# Patient Record
(demographics unavailable — no encounter records)

---

## 2024-11-07 NOTE — HISTORY OF PRESENT ILLNESS
[FreeTextEntry1] : annual exam [de-identified] : annual exam  patient states that she has been having more palpitations after burning hand  would like a refill of metoprolol that she takes for palpitations   recent mammo normal  had a colonoscopy recently soha james would like celiac profile done

## 2024-11-07 NOTE — HEALTH RISK ASSESSMENT
[Good] : ~his/her~  mood as  good [No] : In the past 12 months have you used drugs other than those required for medical reasons? No [No falls in past year] : Patient reported no falls in the past year [0] : 1) Little interest or pleasure doing things: Not at all (0) [1] : 2) Feeling down, depressed, or hopeless for several days (1) [Never] : Never [Patient reported mammogram was normal] : Patient reported mammogram was normal [With Significant Other] : lives with significant other [Smoke Detector] : smoke detector [Carbon Monoxide Detector] : carbon monoxide detector [Safety elements used in home] : safety elements used in home [Seat Belt] :  uses seat belt [Sunscreen] : uses sunscreen [PHQ-2 Negative - No further assessment needed] : PHQ-2 Negative - No further assessment needed [Patient reported colonoscopy was normal] : Patient reported colonoscopy was normal [FreeTextEntry1] : palpitations [Audit-CScore] : 0 [de-identified] : aqua therapy [de-identified] : healthy [de-identified] : pt had unsteady gait [[id number]] : 1 [Reports changes in hearing] : Reports no changes in hearing [Reports changes in vision] : Reports no changes in vision [Reports changes in dental health] : Reports no changes in dental health [MammogramDate] : 10/2024 [PapSmearComments] : awhile [BoneDensityComments] : never done [ColonoscopyDate] : 08/2024 [de-identified] : need help [de-identified] : need help

## 2024-11-07 NOTE — ASSESSMENT
[FreeTextEntry1] : annual exam  patient states that she has been having more palpitations after burning hand  would like a refill of metoprolol that she takes for palpitations   recent mammo normal  had a colonoscopy recently soha james would like celiac profile done  Blood work drawn in office today

## 2025-01-15 NOTE — HISTORY OF PRESENT ILLNESS
[FreeTextEntry1] : follow up [de-identified] : follow up  main concern is depression and mood- has been having issues with depressed mood and has been snapping at people in her life and is concerned over her mental health sees pain management - they recently lowered gabapentin and Cymbalta  - will refer to  counseling  - disability - from spinal stenosis and leg weakness and arm weakness needs a letter for a program to get work while on disability- describing her limitations cannot sit for over 20 minutes and cannot stand for over 10 minutes  has pain to transfer from sitting to standing position

## 2025-01-15 NOTE — PHYSICAL EXAM
[No Acute Distress] : no acute distress [Well Nourished] : well nourished [Well Developed] : well developed [Well-Appearing] : well-appearing [Normal Sclera/Conjunctiva] : normal sclera/conjunctiva [PERRL] : pupils equal round and reactive to light [EOMI] : extraocular movements intact [Normal Outer Ear/Nose] : the outer ears and nose were normal in appearance [Normal Oropharynx] : the oropharynx was normal [No JVD] : no jugular venous distention [No Lymphadenopathy] : no lymphadenopathy [Supple] : supple [Thyroid Normal, No Nodules] : the thyroid was normal and there were no nodules present [No Respiratory Distress] : no respiratory distress  [No Accessory Muscle Use] : no accessory muscle use [Clear to Auscultation] : lungs were clear to auscultation bilaterally [Normal Rate] : normal rate  [Regular Rhythm] : with a regular rhythm [Normal S1, S2] : normal S1 and S2 [No Murmur] : no murmur heard [No Carotid Bruits] : no carotid bruits [No Abdominal Bruit] : a ~M bruit was not heard ~T in the abdomen [No Varicosities] : no varicosities [Pedal Pulses Present] : the pedal pulses are present [No Edema] : there was no peripheral edema [No Palpable Aorta] : no palpable aorta [No Extremity Clubbing/Cyanosis] : no extremity clubbing/cyanosis [Soft] : abdomen soft [Non Tender] : non-tender [Non-distended] : non-distended [No Masses] : no abdominal mass palpated [No HSM] : no HSM [Normal Bowel Sounds] : normal bowel sounds [Normal Posterior Cervical Nodes] : no posterior cervical lymphadenopathy [Normal Anterior Cervical Nodes] : no anterior cervical lymphadenopathy [No CVA Tenderness] : no CVA  tenderness [No Spinal Tenderness] : no spinal tenderness [No Joint Swelling] : no joint swelling [Grossly Normal Strength/Tone] : grossly normal strength/tone [No Rash] : no rash [Coordination Grossly Intact] : coordination grossly intact [No Focal Deficits] : no focal deficits [Deep Tendon Reflexes (DTR)] : deep tendon reflexes were 2+ and symmetric [Normal Affect] : the affect was normal [Normal Insight/Judgement] : insight and judgment were intact [de-identified] : leg weakness

## 2025-01-15 NOTE — REVIEW OF SYSTEMS
[Palpitations] : palpitations [Muscle Weakness] : muscle weakness [Back Pain] : back pain [Negative] : Heme/Lymph

## 2025-01-15 NOTE — ASSESSMENT
[FreeTextEntry1] : follow up  main concern is depression and mood- has been having issues with depressed mood and has been snapping at people in her life and is concerned over her mental health sees pain management - they recently lowered gabapentin and Cymbalta  - will refer to  counseling  - disability - from spinal stenosis and leg weakness and arm weakness needs a letter for a program to get work while on disability- describing her limitations cannot sit for over 20 minutes and cannot stand for over 10 minutes  has pain to transfer from sitting to standing position

## 2025-02-04 NOTE — HEALTH RISK ASSESSMENT
[Never (0 pts)] : Never (0 points) [No] : In the past 12 months have you used drugs other than those required for medical reasons? No [No falls in past year] : Patient reported no falls in the past year [1] : 2) Feeling down, depressed, or hopeless for several days (1) [de-identified] : no [de-identified] : santosh [Audit-CScore] : 0 [de-identified] : walking  [de-identified] : healthy [[id number]] : 2

## 2025-02-04 NOTE — REVIEW OF SYSTEMS
[Fever] : fever [Chills] : chills [Diarrhea] : diarrhea [Vomiting] : vomiting [Negative] : Heme/Lymph [Abdominal Pain] : no abdominal pain

## 2025-02-04 NOTE — HISTORY OF PRESENT ILLNESS
[FreeTextEntry1] : acute visit [de-identified] : acute visit  c/o gastrointestinal symptoms of diarrhea and vomiting  +chills and body aches and fever

## 2025-02-19 NOTE — ASSESSMENT
[FreeTextEntry1] : [facility] - was admitted for concern for a stroke developed facial droop and eyebrow straightening and tongue heaviness CT MRI was negative needs echo and carotid- will refer to cardiology for this is going to follow up with neuro as per HPI

## 2025-02-19 NOTE — HISTORY OF PRESENT ILLNESS
[Admitted on: ___] : The patient was admitted on [unfilled] [Discharged on ___] : discharged on [unfilled] [Discharge Summary] : discharge summary [Pertinent Labs] : pertinent labs [Radiology Findings] : radiology findings [Discharge Med List] : discharge medication list [Med Reconciliation] : medication reconciliation has been completed [Patient Contacted By: ____] : and contacted by [unfilled] [FreeTextEntry2] : [facility] - was admitted for concern for a stroke developed facial droop and eyebrow straightening and tongue heaviness CT MRI was negative needs echo and carotid- will refer to cardiology for this is going to follow up with neuro

## 2025-02-19 NOTE — PHYSICAL EXAM
[No Acute Distress] : no acute distress [Well Nourished] : well nourished [Well Developed] : well developed [Well-Appearing] : well-appearing [Normal Sclera/Conjunctiva] : normal sclera/conjunctiva [PERRL] : pupils equal round and reactive to light [EOMI] : extraocular movements intact [Normal Outer Ear/Nose] : the outer ears and nose were normal in appearance [Normal Oropharynx] : the oropharynx was normal [No JVD] : no jugular venous distention [No Lymphadenopathy] : no lymphadenopathy [Supple] : supple [Thyroid Normal, No Nodules] : the thyroid was normal and there were no nodules present [No Respiratory Distress] : no respiratory distress  [No Accessory Muscle Use] : no accessory muscle use [Clear to Auscultation] : lungs were clear to auscultation bilaterally [Normal Rate] : normal rate  [Regular Rhythm] : with a regular rhythm [Normal S1, S2] : normal S1 and S2 [No Murmur] : no murmur heard [No Carotid Bruits] : no carotid bruits [No Abdominal Bruit] : a ~M bruit was not heard ~T in the abdomen [No Varicosities] : no varicosities [Pedal Pulses Present] : the pedal pulses are present [No Edema] : there was no peripheral edema [No Palpable Aorta] : no palpable aorta [No Extremity Clubbing/Cyanosis] : no extremity clubbing/cyanosis [Soft] : abdomen soft [Non Tender] : non-tender [Non-distended] : non-distended [No Masses] : no abdominal mass palpated [No HSM] : no HSM [Normal Bowel Sounds] : normal bowel sounds [Normal Posterior Cervical Nodes] : no posterior cervical lymphadenopathy [Normal Anterior Cervical Nodes] : no anterior cervical lymphadenopathy [No CVA Tenderness] : no CVA  tenderness [No Spinal Tenderness] : no spinal tenderness [No Joint Swelling] : no joint swelling [Grossly Normal Strength/Tone] : grossly normal strength/tone [No Rash] : no rash [Coordination Grossly Intact] : coordination grossly intact [No Focal Deficits] : no focal deficits [Normal Gait] : normal gait [Deep Tendon Reflexes (DTR)] : deep tendon reflexes were 2+ and symmetric [Normal Affect] : the affect was normal [Normal Insight/Judgement] : insight and judgment were intact [de-identified] : slight smile asymmetry

## 2025-06-26 NOTE — ASSESSMENT
[FreeTextEntry1] : follow up  had a thoracic MRI with a neurologist  had an incidental finding of a renal cyst  needs a dedicated US will check urine for infection   weight management - switched from Ozempic to Trulicity

## 2025-06-26 NOTE — HEALTH RISK ASSESSMENT
[No] : In the past 12 months have you used drugs other than those required for medical reasons? No [No falls in past year] : Patient reported no falls in the past year [0] : 2) Feeling down, depressed, or hopeless: Not at all (0) [de-identified] : No [de-identified] : Neurologist Dr. Hollie Marcano, Pain Management Dr. Alegre, Patient had a phone call with Weight loss management [de-identified] : Healthy [de-identified] : PT Once a week for the moment  [de-identified] : No

## 2025-06-26 NOTE — HISTORY OF PRESENT ILLNESS
[FreeTextEntry1] : follow up [de-identified] : follow up  had a thoracic MRI with a neurologist  had an incidental finding of a renal cyst  needs a dedicated MRI  weight management - switched from Ozempic to Trulicity